# Patient Record
Sex: FEMALE | Race: WHITE | NOT HISPANIC OR LATINO | Employment: OTHER | URBAN - METROPOLITAN AREA
[De-identification: names, ages, dates, MRNs, and addresses within clinical notes are randomized per-mention and may not be internally consistent; named-entity substitution may affect disease eponyms.]

---

## 2017-01-01 ENCOUNTER — HOSPITAL ENCOUNTER (EMERGENCY)
Facility: HOSPITAL | Age: 82
Discharge: ANOTHER HOSPITAL | End: 2017-01-28
Attending: SURGERY
Payer: MEDICARE

## 2017-01-01 VITALS
SYSTOLIC BLOOD PRESSURE: 154 MMHG | BODY MASS INDEX: 21.26 KG/M2 | RESPIRATION RATE: 24 BRPM | WEIGHT: 120 LBS | HEART RATE: 122 BPM | TEMPERATURE: 98 F | DIASTOLIC BLOOD PRESSURE: 86 MMHG | OXYGEN SATURATION: 93 %

## 2017-01-01 DIAGNOSIS — R09.02 HYPOXIA: ICD-10-CM

## 2017-01-01 DIAGNOSIS — R79.89 ELEVATED TROPONIN: ICD-10-CM

## 2017-01-01 DIAGNOSIS — I50.9 CONGESTIVE HEART FAILURE, UNSPECIFIED CONGESTIVE HEART FAILURE CHRONICITY, UNSPECIFIED CONGESTIVE HEART FAILURE TYPE: Primary | ICD-10-CM

## 2017-01-01 LAB
ALBUMIN SERPL BCP-MCNC: 3.1 G/DL
ALP SERPL-CCNC: 75 U/L
ALT SERPL W/O P-5'-P-CCNC: 12 U/L
ANION GAP SERPL CALC-SCNC: 21 MMOL/L
APTT BLDCRRT: 23.9 SEC
AST SERPL-CCNC: 16 U/L
BASOPHILS # BLD AUTO: 0.02 K/UL
BASOPHILS NFR BLD: 0.1 %
BILIRUB SERPL-MCNC: 1 MG/DL
BNP SERPL-MCNC: 1301 PG/ML
BUN SERPL-MCNC: 43 MG/DL
CALCIUM SERPL-MCNC: 8.9 MG/DL
CHLORIDE SERPL-SCNC: 109 MMOL/L
CK MB SERPL-MCNC: 2.5 NG/ML
CK MB SERPL-RTO: 3.4 %
CK SERPL-CCNC: 74 U/L
CK SERPL-CCNC: 74 U/L
CO2 SERPL-SCNC: 13 MMOL/L
CREAT SERPL-MCNC: 1.8 MG/DL
D DIMER PPP IA.FEU-MCNC: 7.89 MG/L FEU
DIFFERENTIAL METHOD: ABNORMAL
EOSINOPHIL # BLD AUTO: 0 K/UL
EOSINOPHIL NFR BLD: 0 %
ERYTHROCYTE [DISTWIDTH] IN BLOOD BY AUTOMATED COUNT: 13.8 %
EST. GFR  (AFRICAN AMERICAN): 29 ML/MIN/1.73 M^2
EST. GFR  (NON AFRICAN AMERICAN): 25 ML/MIN/1.73 M^2
GLUCOSE SERPL-MCNC: 403 MG/DL
HCT VFR BLD AUTO: 39.8 %
HGB BLD-MCNC: 13.2 G/DL
INR PPP: 1.1
LYMPHOCYTES # BLD AUTO: 0.5 K/UL
LYMPHOCYTES NFR BLD: 3.3 %
MCH RBC QN AUTO: 28.7 PG
MCHC RBC AUTO-ENTMCNC: 33.2 %
MCV RBC AUTO: 87 FL
MONOCYTES # BLD AUTO: 0.6 K/UL
MONOCYTES NFR BLD: 3.9 %
NEUTROPHILS # BLD AUTO: 14.6 K/UL
NEUTROPHILS NFR BLD: 92.7 %
PLATELET # BLD AUTO: 199 K/UL
PMV BLD AUTO: 11.9 FL
POTASSIUM SERPL-SCNC: 4.5 MMOL/L
PROT SERPL-MCNC: 7.3 G/DL
PROTHROMBIN TIME: 11 SEC
RBC # BLD AUTO: 4.6 M/UL
SODIUM SERPL-SCNC: 143 MMOL/L
TROPONIN I SERPL DL<=0.01 NG/ML-MCNC: 0.17 NG/ML
WBC # BLD AUTO: 15.79 K/UL

## 2017-01-01 PROCEDURE — 96376 TX/PRO/DX INJ SAME DRUG ADON: CPT

## 2017-01-01 PROCEDURE — 96374 THER/PROPH/DIAG INJ IV PUSH: CPT

## 2017-01-01 PROCEDURE — 36556 INSERT NON-TUNNEL CV CATH: CPT

## 2017-01-01 PROCEDURE — 83880 ASSAY OF NATRIURETIC PEPTIDE: CPT

## 2017-01-01 PROCEDURE — 94640 AIRWAY INHALATION TREATMENT: CPT

## 2017-01-01 PROCEDURE — 25000003 PHARM REV CODE 250: Performed by: SURGERY

## 2017-01-01 PROCEDURE — 93010 ELECTROCARDIOGRAM REPORT: CPT | Mod: ,,, | Performed by: INTERNAL MEDICINE

## 2017-01-01 PROCEDURE — 99291 CRITICAL CARE FIRST HOUR: CPT | Mod: 25

## 2017-01-01 PROCEDURE — 85610 PROTHROMBIN TIME: CPT

## 2017-01-01 PROCEDURE — 94660 CPAP INITIATION&MGMT: CPT

## 2017-01-01 PROCEDURE — 27000494 *HC OXYGEN SET UP (STAH ONLY)

## 2017-01-01 PROCEDURE — 84484 ASSAY OF TROPONIN QUANT: CPT

## 2017-01-01 PROCEDURE — 85025 COMPLETE CBC W/AUTO DIFF WBC: CPT

## 2017-01-01 PROCEDURE — 85379 FIBRIN DEGRADATION QUANT: CPT

## 2017-01-01 PROCEDURE — 82553 CREATINE MB FRACTION: CPT

## 2017-01-01 PROCEDURE — 93005 ELECTROCARDIOGRAM TRACING: CPT

## 2017-01-01 PROCEDURE — 94644 CONT INHLJ TX 1ST HOUR: CPT

## 2017-01-01 PROCEDURE — 31500 INSERT EMERGENCY AIRWAY: CPT

## 2017-01-01 PROCEDURE — 63600175 PHARM REV CODE 636 W HCPCS: Performed by: SURGERY

## 2017-01-01 PROCEDURE — 80053 COMPREHEN METABOLIC PANEL: CPT

## 2017-01-01 PROCEDURE — 25000242 PHARM REV CODE 250 ALT 637 W/ HCPCS: Performed by: SURGERY

## 2017-01-01 PROCEDURE — 27000190 HC CPAP FULL FACE MASK W/VALVE

## 2017-01-01 PROCEDURE — 51702 INSERT TEMP BLADDER CATH: CPT

## 2017-01-01 PROCEDURE — 85730 THROMBOPLASTIN TIME PARTIAL: CPT

## 2017-01-01 RX ORDER — NAPROXEN SODIUM 220 MG/1
81 TABLET, FILM COATED ORAL
Status: COMPLETED | OUTPATIENT
Start: 2017-01-01 | End: 2017-01-01

## 2017-01-01 RX ORDER — FUROSEMIDE 10 MG/ML
40 INJECTION INTRAMUSCULAR; INTRAVENOUS
Status: COMPLETED | OUTPATIENT
Start: 2017-01-01 | End: 2017-01-01

## 2017-01-01 RX ORDER — LEVALBUTEROL 1.25 MG/.5ML
1.25 SOLUTION, CONCENTRATE RESPIRATORY (INHALATION)
Status: COMPLETED | OUTPATIENT
Start: 2017-01-01 | End: 2017-01-01

## 2017-01-01 RX ORDER — ALBUTEROL SULFATE 2.5 MG/.5ML
10 SOLUTION RESPIRATORY (INHALATION) CONTINUOUS
Status: DISCONTINUED | OUTPATIENT
Start: 2017-01-01 | End: 2017-01-01 | Stop reason: HOSPADM

## 2017-01-01 RX ADMIN — FUROSEMIDE 40 MG: 10 INJECTION, SOLUTION INTRAMUSCULAR; INTRAVENOUS at 12:01

## 2017-01-01 RX ADMIN — ALBUTEROL SULFATE 10 MG: 2.5 SOLUTION RESPIRATORY (INHALATION) at 10:01

## 2017-01-01 RX ADMIN — ASPIRIN 81 MG 81 MG: 81 TABLET ORAL at 10:01

## 2017-01-01 RX ADMIN — FUROSEMIDE 40 MG: 10 INJECTION, SOLUTION INTRAMUSCULAR; INTRAVENOUS at 11:01

## 2017-01-01 RX ADMIN — LEVALBUTEROL 1.25 MG: 1.25 SOLUTION, CONCENTRATE RESPIRATORY (INHALATION) at 12:01

## 2017-01-28 NOTE — ED NOTES
ACCEPTED TO [facility] PER JORDAN OJEDA MD.  REPORT GIVEN TO CLEM CASTELLANOS.  [facility] CONTACTED FOR TRANSFER.

## 2017-01-28 NOTE — ED PROVIDER NOTES
[facility]                                        January 28, 2017                     Chief Complaint  85 y.o. female with Shortness of Breath (x 3 days)    History of Present Illness  Amelia Michel presents to the emergency room with shortness of breath this morning  Granddaughter states pt has become increasingly short of breath over the last 2-3 days  Patient presents using accessory muscles with obvious tachypnea on presentation here  Patient has a long history of congestive heart failure, family states confusion about Rx  Granddaughter does not know the pt has been taking her medications or not this week  Patient has diminished breath sounds and crackles in all fields bilaterally, 86% RA now    The history is provided by the patient    Past Medical History   -- Diabetes mellitus type II    -- Gout, chronic    -- Hyperlipidemia    -- Hypertension       Past surgical history: Adenoidectomy, hysterectomy  No Known Allergies     Review of Systems and Physical Exam     Review of Systems  -- Constitution - no fever, denies fatigue, no weakness, no chills  -- Eyes - no tearing or redness, no visual disturbance  -- Ear, Nose - no tinnitus or earache, no nasal congestion or discharge  -- Mouth,Throat - no sore throat, no toothache, normal voice, normal swallowing  -- Respiratory - shortness of breath, no GALLO, no cough or colds  -- Cardiovascular - denies chest pain, no palpitations, denies claudication  -- Gastrointestinal - denies abdominal pain, nausea, vomiting, or diarrhea  -- Musculoskeletal - denies back pain, negative for myalgias and arthralgias   -- Neurological - no headache, denies weakness or seizure; no LOC    Vital Signs  -- BP is 154/86 (abnormal) and her pulse is 122   -- Her respiration is 24 (abnormal) and oxygen saturation is 90%     Physical Exam  -- Nursing note and vitals reviewed  -- Constitutional: Appears well-developed and well-nourished  -- Head: Atraumatic.  Normocephalic. No obvious abnormality  -- Eyes: Pupils are equal and reactive to light. Normal conjunctiva and lids  -- Cardiac: Normal rate, regular rhythm and normal heart sounds  -- Pulmonary: crackles in all fields bilaterally    -- Abdominal: Soft, no tenderness. Normal bowel sounds. Normal liver edge  -- Musculoskeletal: Normal range of motion, no effusions. Joints stable   -- Neurological: No focal deficits. Showed good interaction with staff    Emergency Room Course     Treatment and Evaluation  -- The EKG findings today were without concerning findings from baseline   -- Chest x-ray showed no infiltrate and showed no acute pathology     Abnormal lab values  -- CO2 13 (*)   -- Glucose 403 (*)   -- BUN, Bld 43 (*)   -- Creatinine 1.8 (*)   -- Albumin 3.1 (*)   -- Anion Gap 21 (*)   -- eGFR  25 (*)   -- WBC 15.79 (*)   -- Troponin I 0.175 (*)   -- BNP 1301 (*)   -- D-Dimer 7.89 (*)     Medications Given  -- albuterol sulfate nebulizer solution 10 mg ( Nebulization Canceled Entry 1/28/17 1115)   -- aspirin chewable tablet 81 mg (81 mg Oral Given 1/28/17 1023)   -- furosemide injection 40 mg (40 mg Intravenous Given 1/28/17 1135)   -- levalbuterol nebulizer solution 1.25 mg (1.25 mg Nebulization Given 1/28/17 1218)   -- furosemide injection 40 mg (40 mg Intravenous Given 1/28/17 1226)     Central Line  -- Performed by: HERVE SAHA  -- Date/Time: 12:42 PM 1/28/2017   -- Consent Done: Emergent Situation  -- Indications: vascular access  -- Anesthesia: local infiltration  -- Local anesthetic: lidocaine 1% without epinephrine  -- Anesthetic total: 5 ml  -- Preparation: skin prepped with ChloraPrep  -- Location details: right femoral  -- Catheter type: triple lumen  -- Catheter size: 7.5 Fr  -- Number of attempts: 1  -- Post-procedure: line sutured  -- Complications: none    Critical Care ED Physician Time (minutes):  -- Performed by: Herve Saha M.D.  -- Date/Time: 12:43 PM 1/28/2017   -- Direct Patient Care  (Face Time): 15  -- Additional History from Records or Taking Additional History: 10  -- Ordering, Reviewing, and Interpreting Diagnostic Studies: 10  -- Total Time in Documentation: 10  -- Consultation with Other Physicians: 5  -- Consultation with Family Related to Condition: 10  -- Total Critical Care Time: 60    Diagnosis  -- Congestive heart failure  -- Hypoxia  -- Elevated troponin     Disposition and Plan  -- Disposition: transfer  -- Condition: stable  -- The patient needs a higher level of care  -- The patient is appropriate for transfer  -- The patient was accepted for transfer at [facility]    This note is dictated on Dragon Natural Speaking word recognition program.  There are word recognition mistakes that are occasionally missed on review.           Herve Garcia MD  01/28/17 1521

## 2017-01-28 NOTE — PROGRESS NOTES
"                                                Gardiner Catheter Insertion Checklist    This checklist is not a part of the patient's medical record. This form should be completed on every urinary catheter inserted within the facility. After line insertion & form completion, please immediately forward this form to infection control.      Insertion Date:17                    Insertion Time: 1330           Unit: [facility] EMERGENCY DEPARTMENT    Patient Name: Amelia Michel            Patient : 3/21/1931  Patient MRN: [id number]  Patient Age: 85 y.o.                 Patient CSN: [id number]    Inserted By: Judah Martinez           Urethral Catheter 17 1346 Latex 16 Fr. (Active)   Number of days:0           Removal Time   Removed By  Removal Reason:     Prior to the Procedure, did the   [x] Yes  [] No   1. Determine the clinical reason for the insertion of gardiner?  [x] Yes  [] No   2. Practice proper hand hygiene prior to insertion?  [x] Yes  [] No   3. Prep the meatus with Betadine/Betasept and allow appropriate drying time?  [x] Yes  [] No   4. Provide privacy to patient?  [x] Yes  [] No   5. Provide patient education, including reason for gardiner insertion and Procedure?    During the Procedure, did the :  [x] Yes  [] No   1. Use sterile gloves and appropriate PPE?  [x] Yes  [] No   2. Maintain sterile technique throughout?  [] Yes  [x] No   3. Encounter any complications?    Post-Procedure did the  (or designee)  [x] Yes  [] No   1. Check urine output for color, clarity, and amount  [x] Yes  [] No   2. Secure the gardiner catheter?  [x] Yes  [] No   3. Position gardiner below level of bladder but not touching floor?  [x] Yes  [] No   4. Perform perineal care per policy?  [x] Yes  [] No   5. Place the gardiner securely after the surgical procedure:                                Time: 0             Secured by: Judah Martinez      Please explain any "NO" answers at the bottom of this note!      Valid " clinical reasons for indwelling urinary catheterization: Please check appropriate reasons below:    [x] Closely monitor urinary output (hemodynamic monitoring)  [] Open wound in sacral or perineal area and has urinary incontinence  [] Patient is too ill or fatigued to use any other type of urinary collection  [] Patient had recent surgery (i.e. Within 24-48 hrs)  [] Management of urinary incontinence on patient's request  [] Other (please specify)

## 2017-02-04 PROBLEM — I50.9 HEART FAILURE, UNSPECIFIED: Status: ACTIVE | Noted: 2017-01-01

## 2017-02-05 PROBLEM — B96.20 E. COLI URINARY TRACT INFECTION: Status: ACTIVE | Noted: 2017-01-01

## 2017-02-05 PROBLEM — T17.908A ASPIRATION INTO AIRWAY: Status: ACTIVE | Noted: 2017-01-01

## 2017-02-05 PROBLEM — N28.9 RENAL INSUFFICIENCY: Status: ACTIVE | Noted: 2017-01-01

## 2017-02-05 PROBLEM — I24.89 DEMAND ISCHEMIA: Status: ACTIVE | Noted: 2017-01-01

## 2017-02-05 PROBLEM — I50.33 ACUTE ON CHRONIC DIASTOLIC HEART FAILURE: Status: ACTIVE | Noted: 2017-01-01

## 2017-02-05 PROBLEM — N39.0 E. COLI URINARY TRACT INFECTION: Status: ACTIVE | Noted: 2017-01-01

## 2017-02-08 PROBLEM — I24.89 DEMAND ISCHEMIA: Status: RESOLVED | Noted: 2017-01-01 | Resolved: 2017-01-01

## 2017-02-08 PROBLEM — R13.10 DYSPHAGIA: Status: ACTIVE | Noted: 2017-01-01

## 2017-02-08 PROBLEM — R74.8 ELEVATED LIVER ENZYMES: Status: ACTIVE | Noted: 2017-01-01

## 2017-02-08 PROBLEM — D72.829 LEUKOCYTOSIS, UNSPECIFIED: Status: ACTIVE | Noted: 2017-01-01

## 2017-02-09 PROBLEM — R13.19 DYSPHAGIA, NEUROLOGIC: Status: ACTIVE | Noted: 2017-01-01

## 2017-02-10 PROBLEM — I50.9 HEART FAILURE, UNSPECIFIED: Status: ACTIVE | Noted: 2017-01-01

## 2017-02-11 PROBLEM — E87.6 HYPOKALEMIA: Status: ACTIVE | Noted: 2017-01-01

## 2017-02-11 PROBLEM — R74.8 ABNORMAL LIVER ENZYMES: Status: ACTIVE | Noted: 2017-01-01

## 2017-02-17 PROBLEM — R68.89 INADEQUATE ENTERAL NUTRITION INFUSION: Status: ACTIVE | Noted: 2017-01-01

## 2017-02-21 PROBLEM — E87.6 HYPOKALEMIA: Status: RESOLVED | Noted: 2017-01-01 | Resolved: 2017-01-01

## 2017-02-21 PROBLEM — T17.908A ASPIRATION INTO AIRWAY: Status: RESOLVED | Noted: 2017-01-01 | Resolved: 2017-01-01

## 2017-02-21 PROBLEM — R74.8 ABNORMAL LIVER ENZYMES: Status: RESOLVED | Noted: 2017-01-01 | Resolved: 2017-01-01

## 2017-02-23 PROBLEM — N39.0 URINARY TRACT INFECTION WITHOUT HEMATURIA: Status: ACTIVE | Noted: 2017-01-01

## 2017-02-27 PROBLEM — B37.9 CANDIDA ALBICANS INFECTION: Status: ACTIVE | Noted: 2017-01-01

## 2017-03-01 PROBLEM — J96.01 ACUTE RESPIRATORY FAILURE WITH HYPOXIA: Status: ACTIVE | Noted: 2017-01-01

## 2017-03-01 PROBLEM — J93.9 PNEUMOTHORAX: Status: ACTIVE | Noted: 2017-01-01

## 2017-03-03 PROBLEM — N18.30 CKD (CHRONIC KIDNEY DISEASE), STAGE III: Status: ACTIVE | Noted: 2017-01-01

## 2017-03-03 PROBLEM — I12.9 HYPERTENSIVE KIDNEY DISEASE: Status: ACTIVE | Noted: 2017-01-01

## 2017-03-03 PROBLEM — E63.9 INADEQUATE DIETARY ENERGY INTAKE: Status: ACTIVE | Noted: 2017-01-01

## 2017-03-03 PROBLEM — N17.9 AKI (ACUTE KIDNEY INJURY): Status: ACTIVE | Noted: 2017-01-01

## 2017-03-03 PROBLEM — J44.1 ACUTE EXACERBATION OF CHRONIC OBSTRUCTIVE PULMONARY DISEASE (COPD): Status: ACTIVE | Noted: 2017-01-01

## 2017-03-03 PROBLEM — J69.0 ASPIRATION PNEUMONIA: Status: ACTIVE | Noted: 2017-01-01

## 2017-03-04 PROBLEM — D63.8 ANEMIA OF CHRONIC DISEASE: Status: ACTIVE | Noted: 2017-01-01
